# Patient Record
(demographics unavailable — no encounter records)

---

## 2024-11-08 NOTE — ASSESSMENT
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 2nd DsDNA was negative =will Monitor for lupus symptoms  #RA:CDAI 0 in  nov 2024  On humira since 2019 w/ remission w/ persistent inflammatory markers. 6/2023 US of b/l hands w/o tenosynovitis. 5/2024, says she ran out of Humira since 2/2024, denies joint pain/swelling/stiffness w/ pt global assessment is 0. -Discussed with patient about RUTHY positivity, initial dsDNA was elevated, with repeat, dsDNA was negative. Educated about symptoms of lupus, Verbalized understanding. Will c/w Humira as she has good disease control, no lupus symptoms.  -repeat labs today including, CBC, CMP, ESR, CRP -xray hand/feet 3/2023 w/o erosion   #Atlanto-axial instability: dx 2015 0.3cm progressed to 0.5cm 12/2021 on Xray. x-ray 3/2023 w/ 2mm increase in progression since 2021. MRI 8/2023 w/ 0.4cm, stable disease. MRI c-spine: Aug 2024, stable AA instability, incidental pituitary enhancement, To follow up with neurosurgery. -FU MRI brain pituitary protocol. -No neck pain, tingling and numbness and weakness -Referral given to neurosurgeon and reemphasized the importance of seeing neurosurgeon for her AAD and pituitary enhancement  #skin nodule near L elbow: -Developed nodule by L elbow 1/2023. US consistent with RA nodule   #elevated ESR/CRP: Pt w/ chronic elevated inflammatory markers, despite low CDAI/RA activity clinically. -c/w RA treatment as above -SPEP/UPEP/IF unremarkable  -PAPs smear negative, pending Mammo -will repeat SPEP  #Vitamin D insufficiency -c/w Vitamin D 2000U daily -Continues to menstruate regularly, will hold off on DEXA for now.   #Pitutary enhancement -Fu with neurosurgery, missed her appt on oct 2nd, will send a referral   #HCM -PCP referral given: Needs mammo and colonoscopy following w/ gyn -had PAP, referred for mammogram has referral for screening c-scope She received 2 doses of Moderna in 6/2021 PPSV23 2/2016 and 3/2022, PCV 20(8/9/2024),  Order Tdap to [facility] quant, hep wnl 8/2024 fLU VACCINE TODAY (nov 2024)  RTO 3 months d/w Dr Carrasco

## 2024-11-08 NOTE — HISTORY OF PRESENT ILLNESS
[___ Month(s) Ago] : [unfilled] month(s) ago [FreeTextEntry1] : 11/8/2024 Interpretor ID: [id number] Patient feels overall well.  Didnt follow up with neurosurgery for AAD or Pituitary enhancement. No joint pain, No morning stiffness , no rash, shortness of breath, cough, oral ulcers

## 2024-11-08 NOTE — PHYSICAL EXAM
[General Appearance - Alert] : alert [General Appearance - Well Nourished] : well nourished [Sclera] : the sclera and conjunctiva were normal [Examination Of The Oral Cavity] : the lips and gums were normal [Nasal Cavity] : the nasal mucosa and septum were normal [Neck Appearance] : the appearance of the neck was normal [Auscultation Breath Sounds / Voice Sounds] : lungs were clear to auscultation bilaterally [Heart Sounds] : normal S1 and S2 [Abdomen Soft] : soft [Abnormal Walk] : normal gait [Nail Clubbing] : no clubbing  or cyanosis of the fingernails [Musculoskeletal - Swelling] : no joint swelling seen [Skin Color & Pigmentation] : normal skin color and pigmentation [] : no rash [Skin Lesions] : no skin lesions [Oriented To Time, Place, And Person] : oriented to person, place, and time [FreeTextEntry1] : NO ORAL ULCER

## 2025-02-21 NOTE — HISTORY OF PRESENT ILLNESS
[___ Month(s) Ago] : [unfilled] month(s) ago [FreeTextEntry1] : 2/21/2025 Interpretor [id number] Patient reports feeling well. Occasional self limiting left knee pain.  Denies morning stiffness,joint pains, rash, oral ulcers, SOB, sicca symptoms, fever, weight loss

## 2025-02-21 NOTE — PHYSICAL EXAM
[General Appearance - Alert] : alert [General Appearance - Well Nourished] : well nourished [Sclera] : the sclera and conjunctiva were normal [Examination Of The Oral Cavity] : the lips and gums were normal [Nasal Cavity] : the nasal mucosa and septum were normal [Neck Appearance] : the appearance of the neck was normal [Auscultation Breath Sounds / Voice Sounds] : lungs were clear to auscultation bilaterally [Heart Sounds] : normal S1 and S2 [Abdomen Soft] : soft [Abnormal Walk] : normal gait [Nail Clubbing] : no clubbing  or cyanosis of the fingernails [Musculoskeletal - Swelling] : no joint swelling seen [Skin Color & Pigmentation] : normal skin color and pigmentation [] : no rash [Skin Lesions] : no skin lesions [Oriented To Time, Place, And Person] : oriented to person, place, and time [FreeTextEntry1] : faint non pruritic erythema over the neck ?ornament allergy

## 2025-02-21 NOTE — ASSESSMENT
[FreeTextEntry1] : 45 yo F PMH seropositive/nonerosive RA (dx 2012) c/b atlantoaxial instability (dx 2015) presenting for follow up of RA  #Positive RUTHY 1:640 homogenous, ds-dna 216> negative, histone negative  =checked after hx of TNF use, likely TNF induced antibody production =pt has no clinical symptoms of SLE or even active RA at this. 2nd DsDNA was negative =will Monitor for lupus symptoms  #RA:CDAI 4 in  feb 2025  On humira since 2019 w/ remission w/ persistent inflammatory markers. 6/2023 US of b/l hands w/o tenosynovitis. 5/2024, says she ran out of Humira since 2/2024, denies joint pain/swelling/stiffness w/ pt global assessment is 0. -Discussed with patient about RUTHY positivity, initial dsDNA was elevated, with repeat, dsDNA was negative. Educated about symptoms of lupus, Verbalized understanding. Will c/w Humira as she has good disease control, no lupus symptoms.  -repeat labs today including, CBC, CMP, ESR, CRP -xray hand/feet 3/2023 w/o erosion   #Atlanto-axial instability: dx 2015 0.3cm progressed to 0.5cm 12/2021 on Xray. x-ray 3/2023 w/ 2mm increase in progression since 2021. MRI 8/2023 w/ 0.4cm, stable disease. MRI c-spine: Aug 2024, stable AA instability, incidental pituitary enhancement, To follow up with neurosurgery. -FU MRI brain pituitary protocol. -No neck pain, tingling and numbness and weakness -Referral given to neurosurgeon and reemphasized the importance of seeing neurosurgeon for her AAD and pituitary enhancement  #skin nodule near L elbow: -Developed nodule by L elbow 1/2023. US consistent with RA nodule   #elevated ESR/CRP: Pt w/ chronic elevated inflammatory markers, despite low CDAI/RA activity clinically. -c/w RA treatment as above -SPEP/UPEP/IF unremarkable  -PAPs smear negative, pending Mammo([facility])  #Vitamin D insufficiency -c/w Vitamin D 2000U daily -Continues to menstruate regularly, will hold off on DEXA for now.   #Pitutary enhancement -Fu with neurosurgery, missed her appt on oct 2nd, will send a referral   #HCM -PCP referral given: Needs mammo and colonoscopy following w/ gyn -had PAP, referred for mammogram has referral for screening c-scope She received 2 doses of Moderna in 6/2021 PPSV23 2/2016 and 3/2022, PCV 20(8/9/2024),  Order Tdap to [facility] [facility] [employer] quant, hep wnl 5/2024 fLU VACCINE TODAY (nov 2024)  RTO 3 months d/w Dr Carrasco